# Patient Record
Sex: FEMALE | Race: WHITE | NOT HISPANIC OR LATINO | Employment: FULL TIME | URBAN - METROPOLITAN AREA
[De-identification: names, ages, dates, MRNs, and addresses within clinical notes are randomized per-mention and may not be internally consistent; named-entity substitution may affect disease eponyms.]

---

## 2018-01-16 NOTE — CONSULTS
Therapy  Rehabilitation Services Referral:   Patient Status: post-operative  Diagnosis: ORIF I left calcaneus  Rehabilitation Services: evaluate and treat patient as needed  Exercise/Treatment: AROM/PROM, ankle/foot and strengthening/PRE  Frequency: 3 times per week, for 4 weeks  Please send progress report  I hereby certify that the services indicated above are medically necessary        Signatures   Electronically signed by : GARFIELD Lopez ; Feb 16 2016  2:57PM EST                       (Author)

## 2018-01-16 NOTE — PROGRESS NOTES
Assessment    1  Closed displaced fracture of left calcaneus, unspecified portion of calcaneus, initial   encounter (825 0) (S92 [id number])     Close fracture left calcaneus and a 20-year-old female is comminuted operative repair is indicated semination rescanned finds a be suitable for calcaneal fracture surgery she was scheduled for nor for tomorrow and I would welcome the opportunity see her back in the office is a postoperative patient     Plan  Closed displaced fracture of left calcaneus, unspecified portion of calcaneus, initial  encounter    · Follow Up After Surgery Evaluation and Treatment  Follow-up  Status: Hold For -  Scheduling  Requested for: [id number]    Chief Complaint    1  Ankle Pain    History of Present Illness  HPI: 20-year-old female with a comminuted left calcaneus fracture she's been in a compressive dressing and a posterior splint and the left lower extremity for the past one week she presents for a skin check prior to oh are EF of her left calcaneus which is scheduled for tomorrow      Review of Systems    Constitutional: No fever, no chills, feels well, no tiredness, no recent weight gain or loss  Eyes: No complaints of eyesight problems, no red eyes  ENT: no loss of hearing, no nosebleeds, no sore throat  Cardiovascular: No complaints of chest pain, no palpitations, no leg claudication or lower extremity edema  Respiratory: no compliants of shortness of breath, no wheezing, no cough  Gastrointestinal: no complaints of abdominal pain, no constipation, no nausea or diarrhea, no vomiting, no bloody stools  Genitourinary: no complaints of dysuria, no incontinence  Musculoskeletal: as noted in HPI  Integumentary: no complaints of skin rash or lesion, no itching or dry skin, no skin wounds  Neurological: no complaints of headache, no confusion, no numbness or tingling, no dizziness     Endocrine: No complaints of muscle weakness, no feelings of weakness, no frequent urination, no excessive thirst    Psychiatric: No suicidal thoughts, no anxiety, no feelings of depression  Active Problems    1  Closed displaced fracture of left calcaneus, unspecified portion of calcaneus, initial   encounter (825 0) (G98 115C)    Past Medical History    The active problems and past medical history were reviewed and updated today  Surgical History    The surgical history was reviewed and updated today  Social History    · Never a smoker    Current Meds   1  Ibuprofen 600 MG Oral Tablet Recorded   2  Magnesium Oxide 400 (240 Mg) MG Oral Tablet Recorded   3  Minastrin 24 Fe 1-20 MG-MCG(24) Oral Tablet Chewable; Therapy: (Recorded:01Feb2016) to Recorded   4  Multi-Vitamins Oral Tablet Recorded   5  SUMAtriptan Succinate 50 MG Oral Tablet Recorded   6  ZyrTEC Allergy 10 MG Oral Tablet Recorded    Allergies    1  Penicillins    Vitals   Recorded: 03Feb2016 12:53PM   Heart Rate 788   Systolic 411   Diastolic 81   Weight 945 lb      Physical Exam   Cardiovascular exam reveals a regular rate and rhythm lungs are clear bilaterally left extremity has calf atrophy there is no palpable cords is no tenderness there is a positive wrinkle sign over the skin overlying the left lateral hindfoot her toes of ecchymotic standing        Future Appointments    Date/Time Provider Specialty Site   02/04/2016 10:00 AM GARFIELD Argueta  [address] [place] [address] OR   02/16/2016 01:30 PM GARFIELD Argueta   Orthopedic Surgery [facility]     Signatures   Electronically signed by : GARFIELD Hoyt ; Feb  3 2016  1:08PM EST                       (Author)

## 2018-01-17 NOTE — MISCELLANEOUS
Message  Return to work or school:   Modesto Howe is under my professional care  She was seen in my office on 2/16/2016   She is able to return to work on  as of 2/11/2016 from home      Should be viewed a candidate for return to work on 2/18/2016          Signatures   Electronically signed by : GARFIELD Lindsey ; Feb 16 2016  2:07PM EST                       (Author)    Electronically signed by : GARFIELD Lindsey ; Feb 16 2016  2:57PM EST                       (Author)

## 2018-11-26 RX ORDER — SUMATRIPTAN 50 MG/1
TABLET, FILM COATED ORAL
COMMUNITY
End: 2018-11-27

## 2018-11-26 RX ORDER — FOLLITROPIN 350 [IU]/.42ML
INJECTION, SOLUTION SUBCUTANEOUS
Refills: 3 | COMMUNITY
Start: 2018-10-09

## 2018-11-26 RX ORDER — SYRINGE WITH NEEDLE, 1 ML 28GX1/2"
SYRINGE, EMPTY DISPOSABLE MISCELLANEOUS
Refills: 0 | COMMUNITY
Start: 2018-10-09 | End: 2018-11-27

## 2018-11-26 RX ORDER — LANOLIN ALCOHOL/MO/W.PET/CERES
CREAM (GRAM) TOPICAL
COMMUNITY
End: 2018-11-27

## 2018-11-26 RX ORDER — INFLUENZA A VIRUS A/MICHIGAN/45/2015 X-275 (H1N1) ANTIGEN (FORMALDEHYDE INACTIVATED), INFLUENZA A VIRUS A/HONG KONG/4801/2014 X-263B (H3N2) ANTIGEN (FORMALDEHYDE INACTIVATED), INFLUENZA B VIRUS B/PHUKET/3073/2013 ANTIGEN (FORMALDEHYDE INACTIVATED), AND INFLUENZA B VIRUS B/BRISBANE/60/2008 ANTIGEN (FORMALDEHYDE INACTIVATED) 15; 15; 15; 15 UG/.5ML; UG/.5ML; UG/.5ML; UG/.5ML
INJECTION, SUSPENSION INTRAMUSCULAR
Refills: 0 | COMMUNITY
Start: 2018-10-27

## 2018-11-26 RX ORDER — IBUPROFEN 600 MG/1
TABLET ORAL
COMMUNITY

## 2018-11-26 RX ORDER — ALCOHOL ANTISEPTIC PADS
PADS, MEDICATED (EA) TOPICAL
Refills: 0 | COMMUNITY
Start: 2018-10-09 | End: 2018-11-27

## 2018-11-26 RX ORDER — MENOTROPINS 75 UNIT
KIT SUBCUTANEOUS
Refills: 1 | COMMUNITY
Start: 2018-10-09 | End: 2018-11-27

## 2018-11-26 RX ORDER — AZITHROMYCIN 250 MG/1
TABLET, FILM COATED ORAL
Refills: 0 | COMMUNITY
Start: 2018-10-09 | End: 2018-11-27

## 2018-11-26 RX ORDER — NORETHINDRONE ACETATE AND ETHINYL ESTRADIOL AND FERROUS FUMARATE 1MG-20(24)
1 KIT ORAL DAILY
Refills: 3 | COMMUNITY
Start: 2018-09-13 | End: 2018-11-27

## 2018-11-26 RX ORDER — SYRINGE, DISPOSABLE, 1 ML
SYRINGE, EMPTY DISPOSABLE MISCELLANEOUS
Refills: 1 | COMMUNITY
Start: 2018-10-09 | End: 2018-11-27

## 2018-11-26 RX ORDER — NEEDLES, DISPOSABLE 25GX5/8"
NEEDLE, DISPOSABLE MISCELLANEOUS
Refills: 1 | COMMUNITY
Start: 2018-10-09 | End: 2018-11-27

## 2018-11-27 ENCOUNTER — HOSPITAL ENCOUNTER (OUTPATIENT)
Dept: RADIOLOGY | Facility: HOSPITAL | Age: 33
Discharge: HOME | End: 2018-11-27
Attending: ORTHOPAEDIC SURGERY
Payer: COMMERCIAL

## 2018-11-27 ENCOUNTER — OFFICE VISIT (OUTPATIENT)
Dept: OBGYN CLINIC | Facility: HOSPITAL | Age: 33
End: 2018-11-27
Payer: COMMERCIAL

## 2018-11-27 VITALS
WEIGHT: 143 LBS | BODY MASS INDEX: 22.98 KG/M2 | SYSTOLIC BLOOD PRESSURE: 124 MMHG | HEART RATE: 86 BPM | HEIGHT: 66 IN | DIASTOLIC BLOOD PRESSURE: 84 MMHG

## 2018-11-27 DIAGNOSIS — M79.672 PAIN IN LEFT FOOT: Primary | ICD-10-CM

## 2018-11-27 DIAGNOSIS — M19.172 POST-TRAUMATIC OSTEOARTHRITIS OF LEFT FOOT: ICD-10-CM

## 2018-11-27 DIAGNOSIS — M79.672 PAIN IN LEFT FOOT: ICD-10-CM

## 2018-11-27 PROCEDURE — 73630 X-RAY EXAM OF FOOT: CPT

## 2018-11-27 PROCEDURE — 99213 OFFICE O/P EST LOW 20 MIN: CPT | Performed by: ORTHOPAEDIC SURGERY

## 2018-11-27 PROCEDURE — 73650 X-RAY EXAM OF HEEL: CPT

## 2018-11-27 RX ORDER — SUMATRIPTAN 100 MG/1
100 TABLET, FILM COATED ORAL
COMMUNITY
Start: 2018-01-03 | End: 2019-01-03

## 2018-11-27 RX ORDER — NORETHINDRONE ACETATE AND ETHINYL ESTRADIOL AND FERROUS FUMARATE 1MG-20(24)
KIT ORAL
COMMUNITY
Start: 2018-09-13

## 2018-11-27 RX ORDER — CALCIUM CARBONATE/VITAMIN D3 500-10/5ML
LIQUID (ML) ORAL
COMMUNITY
Start: 2014-09-11

## 2018-11-27 RX ORDER — DIPHENOXYLATE HYDROCHLORIDE AND ATROPINE SULFATE 2.5; .025 MG/1; MG/1
1 TABLET ORAL
COMMUNITY
End: 2018-11-27

## 2018-11-27 RX ORDER — CETIRIZINE HYDROCHLORIDE 10 MG/1
10 TABLET, CHEWABLE ORAL
COMMUNITY
End: 2018-11-27

## 2018-11-27 NOTE — PROGRESS NOTES
26 y o female presents for evaluation of left foot prominence at the base of the 5th metatarsal    She has history of a open reduction internal fixation for calcaneus fracture that was done 2016 by Dr Bhakta [facility]  She states that she has some paresthesias to the area occasionally although this has been present since the time of fixation  She has no difficulty with ambulation although she does feel the deformity in area has been enlarging  She denies any motor deficits  Review of Systems  Review of systems negative unless otherwise specified in HPI    Past Medical History  Past Medical History:   Diagnosis Date    Arrhythmia     Environmental allergies     Leaky heart valve     Migraine     Scoliosis        Past Surgical History  Past Surgical History:   Procedure Laterality Date    IN OPEN TREATMENT CALCANEAL FRACTURE Left 2/4/2016    Procedure: OPEN REDUCTION W/ INTERNAL FIXATION (ORIF) CALCANEUS;  Surgeon: Rona Gilliam MD;  [facility];  Service: Orthopedics    WISDOM TOOTH EXTRACTION         Current Medications  Current Outpatient Prescriptions on File Prior to Visit   Medication Sig Dispense Refill    calcium-vitamin D (OSCAL 500 + D) 500 mg-200 units per tablet Take 1 tablet by mouth daily   [DISCONTINUED] cetirizine (ZyrTEC) 10 mg tablet Take 10 mg by mouth daily   [DISCONTINUED] Magnesium Oxide 400 MG CAPS Take by mouth daily   [DISCONTINUED] multivitamin (THERAGRAN) TABS Take 1 tablet by mouth daily   [DISCONTINUED] Norethin Ace-Eth Estrad-FE (MINASTRIN 24 FE PO) Take by mouth daily   [DISCONTINUED] SUMAtriptan (IMITREX) 100 mg tablet Take 100 mg by mouth once as needed for migraine  No current facility-administered medications on file prior to visit          Recent Labs (HCT,HGB,PT,INR,ESR,CRP,GLU,HgA1C)  No results found for: HCT, HGB, WBC, PT, INR, ESR, CRP, GLUCOSE, HGBA1C      Physical exam  General: Awake, Alert, Oriented  HEENT: No scleral injection, no evidence of facial trauma  Heart: Extremities warm and well perfused  Lungs: No audible wheezing  ·   left Foot  · Minimal tenderness to palpation over base of 5th metatarsal   · No weakness with resisted eversion of foot  · Prominent hardware palpated at calcaneus  Procedure  None    Imaging  Plain films of left calcaneus and foot reveal intact hardware with no evidence of failure   Interval development of TMT arthritis    A/P: 33F with discomfort at base of L 5th MT fracture and paresthesias consistent with post-operative changes and development of TMT arthritis  WBAT LLE in regular shoe wear  Oral analgesics as needed for pain  Follow-up PRN    Denise Jackson  11/27/18

## 2018-12-18 ENCOUNTER — OFFICE VISIT (OUTPATIENT)
Dept: OBGYN CLINIC | Facility: MEDICAL CENTER | Age: 33
End: 2018-12-18
Payer: COMMERCIAL

## 2018-12-18 VITALS
BODY MASS INDEX: 26.66 KG/M2 | DIASTOLIC BLOOD PRESSURE: 77 MMHG | WEIGHT: 160 LBS | SYSTOLIC BLOOD PRESSURE: 126 MMHG | HEART RATE: 96 BPM | HEIGHT: 65 IN

## 2018-12-18 DIAGNOSIS — M25.311 SHOULDER INSTABILITY, RIGHT: Primary | ICD-10-CM

## 2018-12-18 PROCEDURE — 99204 OFFICE O/P NEW MOD 45 MIN: CPT | Performed by: ORTHOPAEDIC SURGERY

## 2018-12-18 NOTE — PROGRESS NOTES
Orthopaedic Surgery - Office Note  Tay Fan (39 y o  female)   : 1985   MRN: [id number]  Encounter Date: 2018    No chief complaint on file  Assessment / Plan  Right shoulder multi-directional instability    · Continue with PT with a focus on scapular stabilizing and cuff strengthening  · Anti-inflammatories and ice prn  · Activity as tolerated  Return in about 6 weeks (around 2019)  History of Present Illness  Catherine Gunn is a 26 y o  female who presents for evaluation of right shoulder pain, acute on chronic  Her pain has been worse since around 2018, when she felt pain while bowling  She felt a pop as she was rolling the ball  She is a former  and reports having shoulder pain and popping when she was younger  She just started PT for her shoulder this week  Review of Systems  Pertinent items are noted in HPI  All other systems were reviewed and are negative  Physical Exam  /77   Pulse 96   Ht 5' 5" (1 651 m)   Wt 72 6 kg (160 lb)   BMI 26 63 kg/m²   Cons: Appears well  No apparent distress  Psych: Alert  Oriented x3  Mood and affect normal   Eyes: PERRLA, EOMI  Resp: Normal effort  No audible wheezing or stridor  CV: Palpable pulse  No discernable arrhythmia  No LE edema  Lymph:  No palpable cervical, axillary, or inguinal lymphadenopathy  Skin: Warm  No palpable masses  No visible lesions  Neuro: Normal muscle tone  Normal and symmetric DTR's  Right Shoulder Exam  Alignment / Posture:  moderate scapular protraction  Inspection:  No swelling  No muscle atrophy  Palpation:  moderate tenderness at anterior and posterior glenohumeral joint  moderate clicking with circumduction  ROM:  Shoulder   Shoulder ER 90  Shoulder IR T6  Shoulder   Shoulder AIR 90  Strength:  Supraspinatus 4/5  Infraspinatus 4/5  Subscapularis 4/5    Stability:  Load / Shift (2+ anterior / 2+ posterior) Sulcus 1cm, significant guarding with load/shift testing  Tests: (+) Henderson  (+) Sarajane Sheets  (-) Neer  (-) Belly press  (-) Speed  Neurovascular:  Sensation intact in Ax/R/M/U nerve distributions  2+ radial pulse  Studies Reviewed  No studies to review    Procedures  No procedures today  Medical, Surgical, Family, and Social History  The patient's medical history, family history, and social history, were reviewed and updated as appropriate  Past Medical History:   Diagnosis Date    Arrhythmia     Environmental allergies     Leaky heart valve     Migraine     Scoliosis        Past Surgical History:   Procedure Laterality Date    MA OPEN TREATMENT CALCANEAL FRACTURE Left 2/4/2016    Procedure: OPEN REDUCTION W/ INTERNAL FIXATION (ORIF) CALCANEUS;  Surgeon: Mickie Ovalles MD;  [facility];  Service: Orthopedics    WISDOM TOOTH EXTRACTION         Family History   Problem Relation Age of Onset    Heart disease Father     Hypertension Father     Breast cancer Other     Cancer Paternal Grandmother        Social History     Occupational History    Not on file  Social History Main Topics    Smoking status: Never Smoker    Smokeless tobacco: Never Used    Alcohol use 1 8 oz/week     3 Glasses of wine per week    Drug use: No    Sexual activity: Not on file       Allergies   Allergen Reactions    Dextromethorphan Other (See Comments)     Other reaction(s): anxiety  Lightheaded dilated pupils jittery     Penicillins Other (See Comments)     Unknown childhood         Current Outpatient Prescriptions:     calcium-vitamin D (OSCAL 500 + D) 500 mg-200 units per tablet, Take 1 tablet by mouth daily  , Disp: , Rfl:     Cetirizine HCl (ZYRTEC ALLERGY) 10 MG CAPS, Take by mouth, Disp: , Rfl:     FLUZONE QUADRIVALENT 0 5 ML RADHA, TO BE ADMINISTERED BY PHARMACIST FOR IMMUNIZATION, Disp: , Rfl: 0    FOLLISTIM  UNT/0  36ML SOLN, INJECT SUBCUTANEOUS AS DIRECTED, Disp: , Rfl: 3    ibuprofen (MOTRIN) 600 mg tablet, Take by mouth, Disp: , Rfl:     Magnesium Oxide 400 MG CAPS, 1 daily, Disp: , Rfl:     Multiple Vitamin (MULTI-VITAMINS PO), Take by mouth, Disp: , Rfl:     Norethin Ace-Eth Estrad-FE (MELODETTA 24 FE) 1-20 MG-MCG(24) CHEW, TAKE 1 TABLET BY MOUTH DAILY  , Disp: , Rfl:     SUMAtriptan (IMITREX) 100 mg tablet, Take 100 mg by mouth, Disp: , Rfl:       Alycia Mcdonald MD    Scribe Attestation    I,:    am acting as a scribe while in the presence of the attending physician :        I,:    personally performed the services described in this documentation    as scribed in my presence :

## 2019-01-29 ENCOUNTER — APPOINTMENT (OUTPATIENT)
Dept: RADIOLOGY | Facility: CLINIC | Age: 34
End: 2019-01-29
Payer: COMMERCIAL

## 2019-01-29 ENCOUNTER — OFFICE VISIT (OUTPATIENT)
Dept: OBGYN CLINIC | Facility: MEDICAL CENTER | Age: 34
End: 2019-01-29
Payer: COMMERCIAL

## 2019-01-29 VITALS
BODY MASS INDEX: 26.92 KG/M2 | WEIGHT: 161.6 LBS | HEART RATE: 84 BPM | HEIGHT: 65 IN | SYSTOLIC BLOOD PRESSURE: 119 MMHG | DIASTOLIC BLOOD PRESSURE: 75 MMHG

## 2019-01-29 DIAGNOSIS — M25.511 RIGHT SHOULDER PAIN, UNSPECIFIED CHRONICITY: ICD-10-CM

## 2019-01-29 DIAGNOSIS — M25.511 RIGHT SHOULDER PAIN, UNSPECIFIED CHRONICITY: Primary | ICD-10-CM

## 2019-01-29 DIAGNOSIS — M25.311 SHOULDER INSTABILITY, RIGHT: ICD-10-CM

## 2019-01-29 PROCEDURE — 99213 OFFICE O/P EST LOW 20 MIN: CPT | Performed by: ORTHOPAEDIC SURGERY

## 2019-01-29 PROCEDURE — 73030 X-RAY EXAM OF SHOULDER: CPT

## 2019-01-29 NOTE — PROGRESS NOTES
Orthopaedic Surgery - Office Note  Catherine Rayo (85 y o  female)   : 1985   MRN: [id number]  Encounter Date: 2019    Chief Complaint   Patient presents with    Right Shoulder - Follow-up       Assessment / Plan  Right shoulder multi-directional instability, continued symptoms despite PT    · MRI arthrogram of right shoulder to evaluate for posterior labral tear  · Continue with PT with a focus on scapular stabilizing and cuff strengthening  · Anti-inflammatories and ice prn  · Activity as tolerated  Return for F/U after MRI  History of Present Illness  Catherine Rayo is a 26 y o  female who presents for evaluation of right shoulder pain, acute on chronic  Her pain has been worse since around 2018, when she felt pain while bowling  She felt a pop as she was rolling the ball  She is a former  and reports having shoulder pain and popping when she was younger  She has been in PT for the last month  She feels stronger and has not had any popping since the last visit  She has soreness and burning posteriorly around the scapula  She has been bowling and feels reasonably well, other than fatigue  She does have a reproducible subluxation on shoulder circumduction  Review of Systems  Pertinent items are noted in HPI  All other systems were reviewed and are negative  Physical Exam  /75   Pulse 84   Ht 5' 5" (1 651 m)   Wt 73 3 kg (161 lb 9 6 oz)   BMI 26 89 kg/m²   Cons: Appears well  No apparent distress  Psych: Alert  Oriented x3  Mood and affect normal   Eyes: PERRLA, EOMI  Resp: Normal effort  No audible wheezing or stridor  CV: Palpable pulse  No discernable arrhythmia  No LE edema  Lymph:  No palpable cervical, axillary, or inguinal lymphadenopathy  Skin: Warm  No palpable masses  No visible lesions  Neuro: Normal muscle tone  Normal and symmetric DTR's       Right Shoulder Exam  Alignment / Posture:  moderate scapular protraction  Inspection:  No swelling  No muscle atrophy  Palpation:  moderate tenderness at anterior and posterior glenohumeral joint  moderate clicking with circumduction  ROM:  Shoulder   Shoulder ER 90  Shoulder IR T6  Shoulder   Shoulder AIR 90  Strength:  Supraspinatus 4/5  Infraspinatus 4/5  Subscapularis 4/5  Stability:  (+) Jerk test  Load / Shift (2+ anterior / 2+ posterior) Sulcus 1cm, significant guarding with load/shift testing  Tests: (+) Henderson  (+) [place]  (-) Neer  (-) Belly press  (-) Speed  Neurovascular:  Sensation intact in Ax/R/M/U nerve distributions  2+ radial pulse  Studies Reviewed  I have personally reviewed pertinent films in PACS  XR of right shoulder - no fractures or degenerative changes    Procedures  No procedures today  Medical, Surgical, Family, and Social History  The patient's medical history, family history, and social history, were reviewed and updated as appropriate  Past Medical History:   Diagnosis Date    Arrhythmia     Environmental allergies     Leaky heart valve     Migraine     Scoliosis        Past Surgical History:   Procedure Laterality Date    CT OPEN TREATMENT CALCANEAL FRACTURE Left 2/4/2016    Procedure: OPEN REDUCTION W/ INTERNAL FIXATION (ORIF) CALCANEUS;  Surgeon: Reinaldo Hargrove MD;  [facility];  Service: Orthopedics    WISDOM TOOTH EXTRACTION         Family History   Problem Relation Age of Onset    Heart disease Father     Hypertension Father     Breast cancer Other     Cancer Paternal Grandmother        Social History     Occupational History    Not on file       Social History Main Topics    Smoking status: Never Smoker    Smokeless tobacco: Never Used    Alcohol use 1 8 oz/week     3 Glasses of wine per week    Drug use: No    Sexual activity: Not on file       Allergies   Allergen Reactions    Dextromethorphan Other (See Comments)     Other reaction(s): anxiety  Lightheaded dilated pupils jitheo     Penicillins Other (See Comments)     Unknown childhood         Current Outpatient Prescriptions:     calcium-vitamin D (OSCAL 500 + D) 500 mg-200 units per tablet, Take 1 tablet by mouth daily  , Disp: , Rfl:     Cetirizine HCl (ZYRTEC ALLERGY) 10 MG CAPS, Take by mouth, Disp: , Rfl:     FLUZONE QUADRIVALENT 0 5 ML RADHA, TO BE ADMINISTERED BY PHARMACIST FOR IMMUNIZATION, Disp: , Rfl: 0    FOLLISTIM  UNT/0  36ML SOLN, INJECT SUBCUTANEOUS AS DIRECTED, Disp: , Rfl: 3    ibuprofen (MOTRIN) 600 mg tablet, Take by mouth, Disp: , Rfl:     Magnesium Oxide 400 MG CAPS, 1 daily, Disp: , Rfl:     Multiple Vitamin (MULTI-VITAMINS PO), Take by mouth, Disp: , Rfl:     Norethin Ace-Eth Estrad-FE (MELODETTA 24 FE) 1-20 MG-MCG(24) CHEW, TAKE 1 TABLET BY MOUTH DAILY  , Disp: , Rfl:       Nancy Hernandez MD    Scribe Attestation    I,:    am acting as a scribe while in the presence of the attending physician :        I,:    personally performed the services described in this documentation    as scribed in my presence :

## 2019-02-14 ENCOUNTER — TRANSCRIBE ORDERS (OUTPATIENT)
Dept: RADIOLOGY | Facility: HOSPITAL | Age: 34
End: 2019-02-14

## 2019-02-14 ENCOUNTER — HOSPITAL ENCOUNTER (OUTPATIENT)
Dept: RADIOLOGY | Facility: HOSPITAL | Age: 34
Discharge: HOME | End: 2019-02-14
Payer: COMMERCIAL

## 2019-02-14 ENCOUNTER — HOSPITAL ENCOUNTER (OUTPATIENT)
Dept: RADIOLOGY | Facility: HOSPITAL | Age: 34
Discharge: HOME | End: 2019-02-14
Attending: ORTHOPAEDIC SURGERY
Payer: COMMERCIAL

## 2019-02-14 DIAGNOSIS — M25.311 SHOULDER INSTABILITY, RIGHT: ICD-10-CM

## 2019-02-14 DIAGNOSIS — M25.511 RIGHT SHOULDER PAIN, UNSPECIFIED CHRONICITY: ICD-10-CM

## 2019-02-14 PROCEDURE — 73040 CONTRAST X-RAY OF SHOULDER: CPT

## 2019-02-14 PROCEDURE — A9585 GADOBUTROL INJECTION: HCPCS | Performed by: PHYSICIAN ASSISTANT

## 2019-02-14 PROCEDURE — 73222 MRI JOINT UPR EXTREM W/DYE: CPT

## 2019-02-14 PROCEDURE — 23350 INJECTION FOR SHOULDER X-RAY: CPT

## 2019-02-14 RX ORDER — LIDOCAINE HYDROCHLORIDE 10 MG/ML
10 INJECTION, SOLUTION INFILTRATION; PERINEURAL
Status: COMPLETED | OUTPATIENT
Start: 2019-02-14 | End: 2019-02-14

## 2019-02-14 RX ORDER — 0.9 % SODIUM CHLORIDE 0.9 %
5 VIAL (ML) INJECTION
Status: COMPLETED | OUTPATIENT
Start: 2019-02-14 | End: 2019-02-14

## 2019-02-14 RX ADMIN — GADOBUTROL 0.2 ML: 604.72 INJECTION INTRAVENOUS at 15:51

## 2019-02-14 RX ADMIN — SODIUM CHLORIDE 15 ML: 9 INJECTION, SOLUTION INTRAMUSCULAR; INTRAVENOUS; SUBCUTANEOUS at 15:52

## 2019-02-14 RX ADMIN — LIDOCAINE HYDROCHLORIDE 5 ML: 10 INJECTION, SOLUTION INFILTRATION; PERINEURAL at 15:52

## 2019-02-14 RX ADMIN — IOHEXOL 3 ML: 300 INJECTION, SOLUTION INTRAVENOUS at 15:50

## 2019-02-26 ENCOUNTER — OFFICE VISIT (OUTPATIENT)
Dept: OBGYN CLINIC | Facility: MEDICAL CENTER | Age: 34
End: 2019-02-26
Payer: COMMERCIAL

## 2019-02-26 VITALS
SYSTOLIC BLOOD PRESSURE: 112 MMHG | BODY MASS INDEX: 26.62 KG/M2 | HEART RATE: 92 BPM | HEIGHT: 65 IN | DIASTOLIC BLOOD PRESSURE: 71 MMHG | WEIGHT: 159.8 LBS

## 2019-02-26 DIAGNOSIS — M25.311 SHOULDER INSTABILITY, RIGHT: Primary | ICD-10-CM

## 2019-02-26 PROCEDURE — 99213 OFFICE O/P EST LOW 20 MIN: CPT | Performed by: ORTHOPAEDIC SURGERY

## 2019-02-26 NOTE — PROGRESS NOTES
Orthopaedic Surgery - Office Note  Macrina Landry (9 y o  female)   : 1985   MRN: [id number]  Encounter Date: 2019    Chief Complaint   Patient presents with    Right Shoulder - Follow-up       Assessment / Plan  Right shoulder multi-directional instability with inferior glenoid labral tear    · MRI arthrogram was reviewed with the patient  · Treatment options were reviewed  · She would like to continue with PT for now  · Anti-inflammatories and ice prn  · Activity as tolerated  Return in about 6 weeks (around 2019)  History of Present Illness  Catherine Andrade is a 37 y o  female who presents for follow up of right shoulder pain, acute on chronic  Her pain has been worse since around 2018, when she felt pain while bowling  She felt a pop as she was rolling the ball  She is a former  and reports having shoulder pain and popping when she was younger  She has been in PT for the past 2 months  She feels stronger and has not had any popping since the last visit  She has soreness and burning posteriorly around the scapula  She has been bowling and feels reasonably well, other than fatigue  She is here to review her shoulder MRI  Review of Systems  Pertinent items are noted in HPI  All other systems were reviewed and are negative  Physical Exam  /71   Pulse 92   Ht 5' 5" (1 651 m)   Wt 72 5 kg (159 lb 12 8 oz)   LMP 02/10/2019   BMI 26 59 kg/m²   Cons: Appears well  No apparent distress  Psych: Alert  Oriented x3  Mood and affect normal   Eyes: PERRLA, EOMI  Resp: Normal effort  No audible wheezing or stridor  CV: Palpable pulse  No discernable arrhythmia  No LE edema  Lymph:  No palpable cervical, axillary, or inguinal lymphadenopathy  Skin: Warm  No palpable masses  No visible lesions  Neuro: Normal muscle tone  Normal and symmetric DTR's       Right Shoulder Exam  Alignment / Posture:  moderate scapular protraction  Inspection:  No swelling  No muscle atrophy  Palpation:  moderate tenderness at anterior and posterior glenohumeral joint  moderate clicking with circumduction  ROM:  Shoulder   Shoulder ER 90  Shoulder IR T6  Shoulder   Shoulder AIR 90  Strength:  Supraspinatus 4/5  Infraspinatus 4/5  Subscapularis 4/5  Stability:  (+) Jerk test  Load / Shift (2+ anterior / 2+ posterior) Sulcus 1cm, significant guarding with load/shift testing  Tests: (+) Henderson  (+) [place]  (-) Neer  (-) Belly press  (-) Speed  Neurovascular:  Sensation intact in Ax/R/M/U nerve distributions  2+ radial pulse  Studies Reviewed  I have personally reviewed pertinent films in PACS  MRI arthrogram of right shoulder - inferior glenoid labral tear    Procedures  No procedures today  Medical, Surgical, Family, and Social History  The patient's medical history, family history, and social history, were reviewed and updated as appropriate  Past Medical History:   Diagnosis Date    Arrhythmia     Environmental allergies     Leaky heart valve     Migraine     Scoliosis        Past Surgical History:   Procedure Laterality Date    [place] OPEN TREATMENT CALCANEAL FRACTURE Left 2/4/2016    Procedure: OPEN REDUCTION W/ INTERNAL FIXATION (ORIF) CALCANEUS;  Surgeon: Reinaldo Hargrove MD;  [facility];  Service: Orthopedics    WISDOM TOOTH EXTRACTION         Family History   Problem Relation Age of Onset    Heart disease Father     Hypertension Father     Breast cancer Other     Cancer Paternal Grandmother        Social History     Occupational History    Not on file   Tobacco Use    Smoking status: Never Smoker    Smokeless tobacco: Never Used   Substance and Sexual Activity    Alcohol use:  Yes     Alcohol/week: 1 8 oz     Types: 3 Glasses of wine per week    Drug use: No    Sexual activity: Not on file       Allergies   Allergen Reactions    Dextromethorphan Other (See Comments)     Other reaction(s): anxiety  Lightheaded dilated pupils jittery     Penicillins Other (See Comments)     Unknown childhood         Current Outpatient Medications:     calcium-vitamin D (OSCAL 500 + D) 500 mg-200 units per tablet, Take 1 tablet by mouth daily  , Disp: , Rfl:     Cetirizine HCl (ZYRTEC ALLERGY) 10 MG CAPS, Take by mouth, Disp: , Rfl:     FLUZONE QUADRIVALENT 0 5 ML RADHA, TO BE ADMINISTERED BY PHARMACIST FOR IMMUNIZATION, Disp: , Rfl: 0    FOLLISTIM  UNT/0  36ML SOLN, INJECT SUBCUTANEOUS AS DIRECTED, Disp: , Rfl: 3    ibuprofen (MOTRIN) 600 mg tablet, Take by mouth, Disp: , Rfl:     Magnesium Oxide 400 MG CAPS, 1 daily, Disp: , Rfl:     Multiple Vitamin (MULTI-VITAMINS PO), Take by mouth, Disp: , Rfl:     Norethin Ace-Eth Estrad-FE (MELODETTA 24 FE) 1-20 MG-MCG(24) CHEW, TAKE 1 TABLET BY MOUTH DAILY  , Disp: , Rfl:       Rusty Nuñez MD    Scribe Attestation    I,:    am acting as a scribe while in the presence of the attending physician :        I,:    personally performed the services described in this documentation    as scribed in my presence :

## 2019-04-02 ENCOUNTER — OFFICE VISIT (OUTPATIENT)
Dept: OBGYN CLINIC | Facility: MEDICAL CENTER | Age: 34
End: 2019-04-02
Payer: COMMERCIAL

## 2019-04-02 VITALS
BODY MASS INDEX: 26.92 KG/M2 | WEIGHT: 161.6 LBS | SYSTOLIC BLOOD PRESSURE: 119 MMHG | HEIGHT: 65 IN | DIASTOLIC BLOOD PRESSURE: 73 MMHG | HEART RATE: 84 BPM

## 2019-04-02 DIAGNOSIS — S43.431A LABRAL TEAR OF SHOULDER, RIGHT, INITIAL ENCOUNTER: Primary | ICD-10-CM

## 2019-04-02 PROCEDURE — 99213 OFFICE O/P EST LOW 20 MIN: CPT | Performed by: ORTHOPAEDIC SURGERY
